# Patient Record
Sex: MALE | Race: WHITE | NOT HISPANIC OR LATINO | URBAN - METROPOLITAN AREA
[De-identification: names, ages, dates, MRNs, and addresses within clinical notes are randomized per-mention and may not be internally consistent; named-entity substitution may affect disease eponyms.]

---

## 2024-09-08 RX ORDER — AMOXICILLIN AND CLAVULANATE POTASSIUM 250; 125 MG/1; MG/1
1 TABLET, FILM COATED ORAL
Refills: 0 | DISCHARGE
Start: 2024-09-08 | End: 2024-09-15

## 2024-09-11 ENCOUNTER — INPATIENT (INPATIENT)
Facility: HOSPITAL | Age: 74
LOS: 0 days | Discharge: HOME | DRG: 602 | End: 2024-09-12
Attending: STUDENT IN AN ORGANIZED HEALTH CARE EDUCATION/TRAINING PROGRAM | Admitting: STUDENT IN AN ORGANIZED HEALTH CARE EDUCATION/TRAINING PROGRAM
Payer: COMMERCIAL

## 2024-09-11 VITALS
HEART RATE: 121 BPM | WEIGHT: 145.06 LBS | SYSTOLIC BLOOD PRESSURE: 130 MMHG | DIASTOLIC BLOOD PRESSURE: 91 MMHG | TEMPERATURE: 98 F | OXYGEN SATURATION: 100 % | RESPIRATION RATE: 16 BRPM

## 2024-09-11 DIAGNOSIS — Z29.9 ENCOUNTER FOR PROPHYLACTIC MEASURES, UNSPECIFIED: ICD-10-CM

## 2024-09-11 DIAGNOSIS — I48.91 UNSPECIFIED ATRIAL FIBRILLATION: ICD-10-CM

## 2024-09-11 DIAGNOSIS — L03.90 CELLULITIS, UNSPECIFIED: ICD-10-CM

## 2024-09-11 DIAGNOSIS — G20.A1 PARKINSON'S DISEASE WITHOUT DYSKINESIA, WITHOUT MENTION OF FLUCTUATIONS: ICD-10-CM

## 2024-09-11 LAB
A1C WITH ESTIMATED AVERAGE GLUCOSE RESULT: 5.2 % — SIGNIFICANT CHANGE UP (ref 4–5.6)
ADD ON TEST-SPECIMEN IN LAB: SIGNIFICANT CHANGE UP
ALBUMIN SERPL ELPH-MCNC: 2.8 G/DL — LOW (ref 3.3–5)
ALBUMIN SERPL ELPH-MCNC: 3.4 G/DL — SIGNIFICANT CHANGE UP (ref 3.3–5)
ALP SERPL-CCNC: 59 U/L — SIGNIFICANT CHANGE UP (ref 40–120)
ALP SERPL-CCNC: 70 U/L — SIGNIFICANT CHANGE UP (ref 40–120)
ALT FLD-CCNC: 16 U/L — SIGNIFICANT CHANGE UP (ref 12–78)
ALT FLD-CCNC: 17 U/L — SIGNIFICANT CHANGE UP (ref 12–78)
ANION GAP SERPL CALC-SCNC: 5 MMOL/L — SIGNIFICANT CHANGE UP (ref 5–17)
ANION GAP SERPL CALC-SCNC: 6 MMOL/L — SIGNIFICANT CHANGE UP (ref 5–17)
APTT BLD: 29.2 SEC — SIGNIFICANT CHANGE UP (ref 24.5–35.6)
AST SERPL-CCNC: 13 U/L — LOW (ref 15–37)
AST SERPL-CCNC: 22 U/L — SIGNIFICANT CHANGE UP (ref 15–37)
BASOPHILS # BLD AUTO: 0 K/UL — SIGNIFICANT CHANGE UP (ref 0–0.2)
BASOPHILS # BLD AUTO: 0.02 K/UL — SIGNIFICANT CHANGE UP (ref 0–0.2)
BASOPHILS NFR BLD AUTO: 0 % — SIGNIFICANT CHANGE UP (ref 0–2)
BASOPHILS NFR BLD AUTO: 0.4 % — SIGNIFICANT CHANGE UP (ref 0–2)
BILIRUB SERPL-MCNC: 0.8 MG/DL — SIGNIFICANT CHANGE UP (ref 0.2–1.2)
BILIRUB SERPL-MCNC: 1.2 MG/DL — SIGNIFICANT CHANGE UP (ref 0.2–1.2)
BUN SERPL-MCNC: 15 MG/DL — SIGNIFICANT CHANGE UP (ref 7–23)
BUN SERPL-MCNC: 18 MG/DL — SIGNIFICANT CHANGE UP (ref 7–23)
CALCIUM SERPL-MCNC: 8.1 MG/DL — LOW (ref 8.5–10.1)
CALCIUM SERPL-MCNC: 8.9 MG/DL — SIGNIFICANT CHANGE UP (ref 8.5–10.1)
CHLORIDE SERPL-SCNC: 105 MMOL/L — SIGNIFICANT CHANGE UP (ref 96–108)
CHLORIDE SERPL-SCNC: 108 MMOL/L — SIGNIFICANT CHANGE UP (ref 96–108)
CHOLEST SERPL-MCNC: 137 MG/DL — SIGNIFICANT CHANGE UP
CO2 SERPL-SCNC: 23 MMOL/L — SIGNIFICANT CHANGE UP (ref 22–31)
CO2 SERPL-SCNC: 27 MMOL/L — SIGNIFICANT CHANGE UP (ref 22–31)
CREAT SERPL-MCNC: 0.8 MG/DL — SIGNIFICANT CHANGE UP (ref 0.5–1.3)
CREAT SERPL-MCNC: 1.03 MG/DL — SIGNIFICANT CHANGE UP (ref 0.5–1.3)
CRP SERPL-MCNC: 16 MG/L — HIGH
D DIMER BLD IA.RAPID-MCNC: 198 NG/ML DDU — SIGNIFICANT CHANGE UP
EGFR: 76 ML/MIN/1.73M2 — SIGNIFICANT CHANGE UP
EGFR: 93 ML/MIN/1.73M2 — SIGNIFICANT CHANGE UP
EOSINOPHIL # BLD AUTO: 0.07 K/UL — SIGNIFICANT CHANGE UP (ref 0–0.5)
EOSINOPHIL # BLD AUTO: 0.28 K/UL — SIGNIFICANT CHANGE UP (ref 0–0.5)
EOSINOPHIL NFR BLD AUTO: 1.3 % — SIGNIFICANT CHANGE UP (ref 0–6)
EOSINOPHIL NFR BLD AUTO: 5 % — SIGNIFICANT CHANGE UP (ref 0–6)
ERYTHROCYTE [SEDIMENTATION RATE] IN BLOOD: 12 MM/HR — SIGNIFICANT CHANGE UP (ref 0–20)
ESTIMATED AVERAGE GLUCOSE: 103 MG/DL — SIGNIFICANT CHANGE UP (ref 68–114)
FLUAV AG NPH QL: SIGNIFICANT CHANGE UP
FLUBV AG NPH QL: SIGNIFICANT CHANGE UP
GLUCOSE SERPL-MCNC: 130 MG/DL — HIGH (ref 70–99)
GLUCOSE SERPL-MCNC: 143 MG/DL — HIGH (ref 70–99)
HCT VFR BLD CALC: 36 % — LOW (ref 39–50)
HCT VFR BLD CALC: 39.7 % — SIGNIFICANT CHANGE UP (ref 39–50)
HDLC SERPL-MCNC: 43 MG/DL — SIGNIFICANT CHANGE UP
HGB BLD-MCNC: 12.5 G/DL — LOW (ref 13–17)
HGB BLD-MCNC: 13.8 G/DL — SIGNIFICANT CHANGE UP (ref 13–17)
IMM GRANULOCYTES NFR BLD AUTO: 0.4 % — SIGNIFICANT CHANGE UP (ref 0–0.9)
INR BLD: 0.89 RATIO — SIGNIFICANT CHANGE UP (ref 0.85–1.18)
LACTATE SERPL-SCNC: 1.2 MMOL/L — SIGNIFICANT CHANGE UP (ref 0.7–2)
LIPID PNL WITH DIRECT LDL SERPL: 83 MG/DL — SIGNIFICANT CHANGE UP
LYMPHOCYTES # BLD AUTO: 1.03 K/UL — SIGNIFICANT CHANGE UP (ref 1–3.3)
LYMPHOCYTES # BLD AUTO: 1.48 K/UL — SIGNIFICANT CHANGE UP (ref 1–3.3)
LYMPHOCYTES # BLD AUTO: 18.8 % — SIGNIFICANT CHANGE UP (ref 13–44)
LYMPHOCYTES # BLD AUTO: 26 % — SIGNIFICANT CHANGE UP (ref 13–44)
MAGNESIUM SERPL-MCNC: 1.9 MG/DL — SIGNIFICANT CHANGE UP (ref 1.6–2.6)
MAGNESIUM SERPL-MCNC: 2.2 MG/DL — SIGNIFICANT CHANGE UP (ref 1.6–2.6)
MANUAL SMEAR VERIFICATION: SIGNIFICANT CHANGE UP
MCHC RBC-ENTMCNC: 30.1 PG — SIGNIFICANT CHANGE UP (ref 27–34)
MCHC RBC-ENTMCNC: 30.3 PG — SIGNIFICANT CHANGE UP (ref 27–34)
MCHC RBC-ENTMCNC: 34.7 GM/DL — SIGNIFICANT CHANGE UP (ref 32–36)
MCHC RBC-ENTMCNC: 34.8 GM/DL — SIGNIFICANT CHANGE UP (ref 32–36)
MCV RBC AUTO: 86.7 FL — SIGNIFICANT CHANGE UP (ref 80–100)
MCV RBC AUTO: 87.4 FL — SIGNIFICANT CHANGE UP (ref 80–100)
MONOCYTES # BLD AUTO: 0.68 K/UL — SIGNIFICANT CHANGE UP (ref 0–0.9)
MONOCYTES # BLD AUTO: 0.74 K/UL — SIGNIFICANT CHANGE UP (ref 0–0.9)
MONOCYTES NFR BLD AUTO: 12 % — SIGNIFICANT CHANGE UP (ref 2–14)
MONOCYTES NFR BLD AUTO: 13.5 % — SIGNIFICANT CHANGE UP (ref 2–14)
NEUTROPHILS # BLD AUTO: 3.24 K/UL — SIGNIFICANT CHANGE UP (ref 1.8–7.4)
NEUTROPHILS # BLD AUTO: 3.61 K/UL — SIGNIFICANT CHANGE UP (ref 1.8–7.4)
NEUTROPHILS NFR BLD AUTO: 57 % — SIGNIFICANT CHANGE UP (ref 43–77)
NEUTROPHILS NFR BLD AUTO: 65.6 % — SIGNIFICANT CHANGE UP (ref 43–77)
NON HDL CHOLESTEROL: 94 MG/DL — SIGNIFICANT CHANGE UP
NRBC # BLD: 0 /100 WBCS — SIGNIFICANT CHANGE UP (ref 0–0)
NRBC # BLD: SIGNIFICANT CHANGE UP /100 WBCS (ref 0–0)
PHOSPHATE SERPL-MCNC: 3.1 MG/DL — SIGNIFICANT CHANGE UP (ref 2.5–4.5)
PLAT MORPH BLD: NORMAL — SIGNIFICANT CHANGE UP
PLATELET # BLD AUTO: 146 K/UL — LOW (ref 150–400)
PLATELET # BLD AUTO: 152 K/UL — SIGNIFICANT CHANGE UP (ref 150–400)
POTASSIUM SERPL-MCNC: 4.1 MMOL/L — SIGNIFICANT CHANGE UP (ref 3.5–5.3)
POTASSIUM SERPL-MCNC: 4.2 MMOL/L — SIGNIFICANT CHANGE UP (ref 3.5–5.3)
POTASSIUM SERPL-SCNC: 4.1 MMOL/L — SIGNIFICANT CHANGE UP (ref 3.5–5.3)
POTASSIUM SERPL-SCNC: 4.2 MMOL/L — SIGNIFICANT CHANGE UP (ref 3.5–5.3)
PROT SERPL-MCNC: 5.6 GM/DL — LOW (ref 6–8.3)
PROT SERPL-MCNC: 6.4 GM/DL — SIGNIFICANT CHANGE UP (ref 6–8.3)
PROTHROM AB SERPL-ACNC: 10.1 SEC — SIGNIFICANT CHANGE UP (ref 9.5–13)
RBC # BLD: 4.12 M/UL — LOW (ref 4.2–5.8)
RBC # BLD: 4.58 M/UL — SIGNIFICANT CHANGE UP (ref 4.2–5.8)
RBC # FLD: 12.6 % — SIGNIFICANT CHANGE UP (ref 10.3–14.5)
RBC # FLD: 12.6 % — SIGNIFICANT CHANGE UP (ref 10.3–14.5)
RBC BLD AUTO: NORMAL — SIGNIFICANT CHANGE UP
RSV RNA NPH QL NAA+NON-PROBE: SIGNIFICANT CHANGE UP
SARS-COV-2 RNA SPEC QL NAA+PROBE: SIGNIFICANT CHANGE UP
SODIUM SERPL-SCNC: 137 MMOL/L — SIGNIFICANT CHANGE UP (ref 135–145)
SODIUM SERPL-SCNC: 137 MMOL/L — SIGNIFICANT CHANGE UP (ref 135–145)
TRIGL SERPL-MCNC: 50 MG/DL — SIGNIFICANT CHANGE UP
TROPONIN I, HIGH SENSITIVITY RESULT: 4.28 NG/L — SIGNIFICANT CHANGE UP
TROPONIN I, HIGH SENSITIVITY RESULT: 7.77 NG/L — SIGNIFICANT CHANGE UP
TSH SERPL-MCNC: 1.28 UU/ML — SIGNIFICANT CHANGE UP (ref 0.34–4.82)
WBC # BLD: 5.49 K/UL — SIGNIFICANT CHANGE UP (ref 3.8–10.5)
WBC # BLD: 5.69 K/UL — SIGNIFICANT CHANGE UP (ref 3.8–10.5)
WBC # FLD AUTO: 5.49 K/UL — SIGNIFICANT CHANGE UP (ref 3.8–10.5)
WBC # FLD AUTO: 5.69 K/UL — SIGNIFICANT CHANGE UP (ref 3.8–10.5)

## 2024-09-11 PROCEDURE — 71045 X-RAY EXAM CHEST 1 VIEW: CPT | Mod: 26

## 2024-09-11 PROCEDURE — 80053 COMPREHEN METABOLIC PANEL: CPT

## 2024-09-11 PROCEDURE — 85025 COMPLETE CBC W/AUTO DIFF WBC: CPT

## 2024-09-11 PROCEDURE — 84100 ASSAY OF PHOSPHORUS: CPT

## 2024-09-11 PROCEDURE — 99223 1ST HOSP IP/OBS HIGH 75: CPT

## 2024-09-11 PROCEDURE — 99053 MED SERV 10PM-8AM 24 HR FAC: CPT

## 2024-09-11 PROCEDURE — 85652 RBC SED RATE AUTOMATED: CPT

## 2024-09-11 PROCEDURE — 93005 ELECTROCARDIOGRAM TRACING: CPT

## 2024-09-11 PROCEDURE — 80061 LIPID PANEL: CPT

## 2024-09-11 PROCEDURE — 87040 BLOOD CULTURE FOR BACTERIA: CPT

## 2024-09-11 PROCEDURE — 93306 TTE W/DOPPLER COMPLETE: CPT | Mod: 26

## 2024-09-11 PROCEDURE — 93010 ELECTROCARDIOGRAM REPORT: CPT

## 2024-09-11 PROCEDURE — 85027 COMPLETE CBC AUTOMATED: CPT

## 2024-09-11 PROCEDURE — 83605 ASSAY OF LACTIC ACID: CPT

## 2024-09-11 PROCEDURE — 83036 HEMOGLOBIN GLYCOSYLATED A1C: CPT

## 2024-09-11 PROCEDURE — 86140 C-REACTIVE PROTEIN: CPT

## 2024-09-11 PROCEDURE — 85379 FIBRIN DEGRADATION QUANT: CPT

## 2024-09-11 PROCEDURE — 80048 BASIC METABOLIC PNL TOTAL CA: CPT

## 2024-09-11 PROCEDURE — 99285 EMERGENCY DEPT VISIT HI MDM: CPT

## 2024-09-11 PROCEDURE — 84443 ASSAY THYROID STIM HORMONE: CPT

## 2024-09-11 PROCEDURE — 83735 ASSAY OF MAGNESIUM: CPT

## 2024-09-11 PROCEDURE — 93971 EXTREMITY STUDY: CPT | Mod: 26,LT

## 2024-09-11 PROCEDURE — 84484 ASSAY OF TROPONIN QUANT: CPT

## 2024-09-11 PROCEDURE — 36415 COLL VENOUS BLD VENIPUNCTURE: CPT

## 2024-09-11 RX ORDER — ENOXAPARIN SODIUM 100 MG/ML
40 INJECTION SUBCUTANEOUS EVERY 24 HOURS
Refills: 0 | Status: DISCONTINUED | OUTPATIENT
Start: 2024-09-11 | End: 2024-09-12

## 2024-09-11 RX ORDER — SODIUM CHLORIDE 9 MG/ML
1000 INJECTION INTRAMUSCULAR; INTRAVENOUS; SUBCUTANEOUS ONCE
Refills: 0 | Status: COMPLETED | OUTPATIENT
Start: 2024-09-11 | End: 2024-09-11

## 2024-09-11 RX ORDER — METOPROLOL TARTRATE 100 MG/1
12.5 TABLET ORAL THREE TIMES A DAY
Refills: 0 | Status: DISCONTINUED | OUTPATIENT
Start: 2024-09-11 | End: 2024-09-12

## 2024-09-11 RX ORDER — DILTIAZEM HYDROCHLORIDE 5 MG/ML
5 INJECTION INTRAVENOUS
Qty: 125 | Refills: 0 | Status: DISCONTINUED | OUTPATIENT
Start: 2024-09-11 | End: 2024-09-11

## 2024-09-11 RX ORDER — CARBIDOPA/LEVODOPA 25MG-250MG
1 TABLET ORAL
Refills: 0 | DISCHARGE

## 2024-09-11 RX ORDER — DOXYCYCLINE MONOHYDRATE 100 MG
100 TABLET ORAL EVERY 12 HOURS
Refills: 0 | Status: DISCONTINUED | OUTPATIENT
Start: 2024-09-11 | End: 2024-09-12

## 2024-09-11 RX ORDER — MUPIROCIN 2 %
1 OINTMENT (GRAM) TOPICAL ONCE
Refills: 0 | Status: COMPLETED | OUTPATIENT
Start: 2024-09-11 | End: 2024-09-11

## 2024-09-11 RX ORDER — ROTIGOTINE 6 MG/24 HR
1 PATCH, TRANSDERMAL 24 HOURS TRANSDERMAL EVERY 24 HOURS
Refills: 0 | Status: DISCONTINUED | OUTPATIENT
Start: 2024-09-11 | End: 2024-09-11

## 2024-09-11 RX ORDER — DILTIAZEM HYDROCHLORIDE 5 MG/ML
20 INJECTION INTRAVENOUS ONCE
Refills: 0 | Status: COMPLETED | OUTPATIENT
Start: 2024-09-11 | End: 2024-09-11

## 2024-09-11 RX ORDER — ONDANSETRON 2 MG/ML
4 INJECTION, SOLUTION INTRAMUSCULAR; INTRAVENOUS EVERY 6 HOURS
Refills: 0 | Status: DISCONTINUED | OUTPATIENT
Start: 2024-09-11 | End: 2024-09-12

## 2024-09-11 RX ORDER — CARBIDOPA/LEVODOPA 25MG-250MG
1 TABLET ORAL THREE TIMES A DAY
Refills: 0 | Status: DISCONTINUED | OUTPATIENT
Start: 2024-09-11 | End: 2024-09-12

## 2024-09-11 RX ORDER — ASPIRIN 81 MG
81 TABLET, DELAYED RELEASE (ENTERIC COATED) ORAL DAILY
Refills: 0 | Status: DISCONTINUED | OUTPATIENT
Start: 2024-09-11 | End: 2024-09-11

## 2024-09-11 RX ORDER — FLU VACCINE TS 2012-2013(5YR+) 45MCG/.5ML
0.5 VIAL (ML) INTRAMUSCULAR ONCE
Refills: 0 | Status: DISCONTINUED | OUTPATIENT
Start: 2024-09-11 | End: 2024-09-12

## 2024-09-11 RX ORDER — ROTIGOTINE 6 MG/24 HR
2 PATCH, TRANSDERMAL 24 HOURS TRANSDERMAL EVERY 24 HOURS
Refills: 0 | Status: DISCONTINUED | OUTPATIENT
Start: 2024-09-11 | End: 2024-09-12

## 2024-09-11 RX ORDER — DOXYCYCLINE MONOHYDRATE 100 MG
TABLET ORAL
Refills: 0 | Status: DISCONTINUED | OUTPATIENT
Start: 2024-09-11 | End: 2024-09-12

## 2024-09-11 RX ORDER — DILTIAZEM HYDROCHLORIDE 5 MG/ML
30 INJECTION INTRAVENOUS ONCE
Refills: 0 | Status: COMPLETED | OUTPATIENT
Start: 2024-09-11 | End: 2024-09-11

## 2024-09-11 RX ORDER — VANCOMYCIN/0.9 % SOD CHLORIDE 1.75G/25
1000 PLASTIC BAG, INJECTION (ML) INTRAVENOUS ONCE
Refills: 0 | Status: COMPLETED | OUTPATIENT
Start: 2024-09-11 | End: 2024-09-11

## 2024-09-11 RX ORDER — ASPIRIN 81 MG
325 TABLET, DELAYED RELEASE (ENTERIC COATED) ORAL ONCE
Refills: 0 | Status: COMPLETED | OUTPATIENT
Start: 2024-09-11 | End: 2024-09-11

## 2024-09-11 RX ORDER — APIXABAN 5 MG/1
5 TABLET, FILM COATED ORAL EVERY 12 HOURS
Refills: 0 | Status: DISCONTINUED | OUTPATIENT
Start: 2024-09-11 | End: 2024-09-11

## 2024-09-11 RX ORDER — ROTIGOTINE 6 MG/24 HR
1 PATCH, TRANSDERMAL 24 HOURS TRANSDERMAL
Refills: 0 | DISCHARGE

## 2024-09-11 RX ORDER — ASPIRIN 81 MG
81 TABLET, DELAYED RELEASE (ENTERIC COATED) ORAL DAILY
Refills: 0 | Status: DISCONTINUED | OUTPATIENT
Start: 2024-09-11 | End: 2024-09-12

## 2024-09-11 RX ORDER — MAGNESIUM, ALUMINUM HYDROXIDE 200-225/5
30 SUSPENSION, ORAL (FINAL DOSE FORM) ORAL EVERY 4 HOURS
Refills: 0 | Status: DISCONTINUED | OUTPATIENT
Start: 2024-09-11 | End: 2024-09-12

## 2024-09-11 RX ORDER — ACETAMINOPHEN 325 MG/1
650 TABLET ORAL EVERY 6 HOURS
Refills: 0 | Status: DISCONTINUED | OUTPATIENT
Start: 2024-09-11 | End: 2024-09-12

## 2024-09-11 RX ORDER — DOXYCYCLINE MONOHYDRATE 100 MG
100 TABLET ORAL ONCE
Refills: 0 | Status: COMPLETED | OUTPATIENT
Start: 2024-09-11 | End: 2024-09-11

## 2024-09-11 RX ADMIN — SODIUM CHLORIDE 1000 MILLILITER(S): 9 INJECTION INTRAMUSCULAR; INTRAVENOUS; SUBCUTANEOUS at 03:59

## 2024-09-11 RX ADMIN — Medication 1 APPLICATION(S): at 05:31

## 2024-09-11 RX ADMIN — Medication 2 PATCH: at 09:01

## 2024-09-11 RX ADMIN — Medication 100 MILLILITER(S): at 06:06

## 2024-09-11 RX ADMIN — Medication 250 MILLIGRAM(S): at 06:08

## 2024-09-11 RX ADMIN — Medication 1 TABLET(S): at 14:29

## 2024-09-11 RX ADMIN — DILTIAZEM HYDROCHLORIDE 20 MILLIGRAM(S): 5 INJECTION INTRAVENOUS at 03:18

## 2024-09-11 RX ADMIN — Medication 325 MILLIGRAM(S): at 06:07

## 2024-09-11 RX ADMIN — Medication 1000 MILLIGRAM(S): at 05:31

## 2024-09-11 RX ADMIN — SODIUM CHLORIDE 1000 MILLILITER(S): 9 INJECTION INTRAMUSCULAR; INTRAVENOUS; SUBCUTANEOUS at 03:17

## 2024-09-11 RX ADMIN — Medication 1 TABLET(S): at 22:36

## 2024-09-11 RX ADMIN — DILTIAZEM HYDROCHLORIDE 30 MILLIGRAM(S): 5 INJECTION INTRAVENOUS at 04:57

## 2024-09-11 RX ADMIN — METOPROLOL TARTRATE 12.5 MILLIGRAM(S): 100 TABLET ORAL at 10:33

## 2024-09-11 RX ADMIN — Medication 110 MILLIGRAM(S): at 08:06

## 2024-09-11 RX ADMIN — METOPROLOL TARTRATE 12.5 MILLIGRAM(S): 100 TABLET ORAL at 14:30

## 2024-09-11 RX ADMIN — Medication 110 MILLIGRAM(S): at 18:11

## 2024-09-11 RX ADMIN — ENOXAPARIN SODIUM 40 MILLIGRAM(S): 100 INJECTION SUBCUTANEOUS at 18:13

## 2024-09-11 NOTE — H&P ADULT - PROBLEM SELECTOR PLAN 2
On augmentin for 3 days  s/p 1 dose rocephin in ED  started on doxycycline. On augmentin for 3 days  s/p 1 dose rocephin in ED  started on doxycycline.  f/u esr/crp  monitor for improvement.

## 2024-09-11 NOTE — ED PROVIDER NOTE - OBJECTIVE STATEMENT
Pt. is a 74 yo M with hx of parkinsons disease presents with chest discomfort and feeling of heart racing that came on suddenly while at rest prior to arrival. Patient does not have any history of cardiac disease or arrhythmia.  He is visiting from [place] and has been in [place] for 2 wks.  States he has left calf pain for 1 week.  He got bit or stung by in insect, had itchy area on left posterior calf that got infected.  He went to urgent care and has been taking augmentin for a few days.  Thinks redness and pain has only minimally improved.  Denies falling or any injury.  Denies fever.  Denies SOB, dizziness, vomiting.

## 2024-09-11 NOTE — CONSULT NOTE ADULT - PROBLEM SELECTOR RECOMMENDATION 9
Continue with ASA  obtain Echo results  Begin Cardizem drip with hold parameters   Continue with Metoprolol with hold parameters  treat infectious process of lLE At Fib with RVR     Continue with ASA (CHADS VASC is 1 based on age) , pt is also a fall risk with h/o of Parkinson Dz  obtain Echo results  Begin Cardizem drip with hold parameters   Continue with Metoprolol with hold parameters  Treat infectious process of lLE At Fib with RVR     Continue with ASA (CHADS VASC is 1 based on age) , pt is also a fall risk with h/o of Parkinson Dz  obtain Echo results  Continue with Metoprolol with hold parameters  Treat infectious process of lLE

## 2024-09-11 NOTE — ED PROVIDER NOTE - CARE PLAN
1 Principal Discharge DX:	Rapid atrial fibrillation   Principal Discharge DX:	Rapid atrial fibrillation  Secondary Diagnosis:	Cellulitis of left lower leg

## 2024-09-11 NOTE — ED PROVIDER NOTE - MUSCULOSKELETAL, MLM
Spine appears normal, range of motion is not limited, mild left calf tenderness; normal pedal pulses

## 2024-09-11 NOTE — ED ADULT TRIAGE NOTE - CHIEF COMPLAINT QUOTE
patient ambulatory to triage complaining of mid sternal chest pressure x 20 minutes that started while laying in bed. states he feels like heart is beating rapidly and skipping beats. denies cardiac history. no respiratory distress. ekg in progress.  patient also complains of worsening cellulitis on left lower calf that has not improved with oral antibiotics.

## 2024-09-11 NOTE — ED PROVIDER NOTE - ENMT, MLM
Retention Suture Bite Size: 3 mm Airway patent, Nasal mucosa clear. Mouth with normal mucosa. Throat has no vesicles, no oropharyngeal exudates and uvula is midline.

## 2024-09-11 NOTE — ED PROVIDER NOTE - SKIN, MLM
+erythema and warmth of left lower leg posteriorly; +circular 1cm ecchymotic blister on posterior distal left lower leg.

## 2024-09-11 NOTE — ED ADULT NURSE NOTE - NSFALLRISKINTERV_ED_ALL_ED

## 2024-09-11 NOTE — H&P ADULT - NSHPPHYSICALEXAM_GEN_ALL_CORE
T(C): 36.6 (09-11-24 @ 02:20), Max: 36.6 (09-11-24 @ 02:20)  HR: 121 (09-11-24 @ 02:20) (121 - 121)  BP: 130/91 (09-11-24 @ 02:20) (130/91 - 130/91)  RR: 16 (09-11-24 @ 02:20) (16 - 16)  SpO2: 100% (09-11-24 @ 02:20) (100% - 100%)    General: Frail  HEENT: non-traumatic, perrla, eomi  Cardio: s1s2, irregular irregular  Lungs: comfortable breathing, clear to auscultation  Abdomen: Soft, non-tender, non-distended  Neuro: AOx4  Ext: Left lower ext lateral round area of erythema with central dark spot, w/ swelling and tender to touch.

## 2024-09-11 NOTE — CONSULT NOTE ADULT - ASSESSMENT
73 year old male with new onset of AT Fib.  Although his lactate level is normal he is being treated for a bug bit of his lower extremity.  He has a hx of Parkinson's Disease.   IN Ed was given Cardizem 20 mgs IVP and now is on Cardizem drip, started on Metoprolol 12.5 mg tid. His lower leg infection is being treated with borad spectum antibiloic  CHADS VASC is 1 based on age.  He lives in [place] dopplers of both legs negative for DVT  PLAN:  Rate control for new onset of AT Fib     Continue with ASA 81 mgs  Obtain ECHO  slow hydration  73 year old male with new onset of AT Fib.  Although his lactate level is normal he is being treated for a bug bit of his lower extremity.  He has a hx of Parkinson's Disease.   IN Ed was given Cardizem 20 mgs IVP and now is on Cardizem drip, started on Metoprolol 12.5 mg tid. His lower leg infection is being treated with broad spectrum antibiotic  CHADS VASC is 1 based on age.  He lives in [place] dopplers of both legs negative for DVT  PLAN:  Rate control for new onset of AT Fib     Continue with ASA 81 mgs  Obtain ECHO  slow hydration   begin Cardizem drip   admit to telemetry  Continue to treat bu bite with antibiotics  73 year old male with new onset of AT Fib.  Although his lactate level is normal he is being treated for a bug bit of his lower extremity.  He has a hx of Parkinson's Disease.   IN Ed was given Cardizem 20 mgs IVP started on Metoprolol 12.5 mg tid. His lower leg infection is being treated with broad spectrum antibiotic  CHADS VASC is 1 based on age howeer with RHD should be on Eliquis until results of ECHO reviewed..  He lives in [place] dopplers of both legs negative for DVT  PLAN:  Rate control for new onset of AT Fib     DC ASA and Begin Eliquis   Obtain ECHO  Slow hydration   admit to telemetry  Continue to treat bug bite with antibiotics  73 year old male with new onset of AT Fib.  Although his lactate level is normal he is being treated for a bug bit of his lower extremity.  He has a hx of Parkinson's Disease.   IN Ed was given Cardizem 20 mgs IVP started on Metoprolol 12.5 mg tid. His lower leg infection is being treated with broad spectrum antibiotic  CHADS VASC is 1 based on age however with RHD should be on Eliquis until results of ECHO reviewed..  He lives in [place] dopplers of both legs negative for DVT    PLAN:  Rate control for new onset of AT Fib    Continue with ASA  Obtain ECHO-pending echo results may start Eliquis   Slow hydration   admit to telemetry  Continue to treat bug bite with antibiotics  73 year old male with new onset of AT Fib.  Although his lactate level is normal he is being treated for a bug bit of his lower extremity.  He has a hx of Parkinson's Disease.   IN Ed was given Cardizem 20 mgs IVP started on Metoprolol 12.5 mg tid. His lower leg infection is being treated with broad spectrum antibiotic  CHADS VASC is 1 based on age however with RHD.  He lives in [place] dopplers of both legs negative for DVT    PLAN:  Rate control for new onset of AT Fib    Continue with ASA  Obtain ECHO-pending echo results may start Eliquis   Slow hydration   admit to telemetry  Continue to treat bug bite with antibiotics

## 2024-09-11 NOTE — CONSULT NOTE ADULT - SUBJECTIVE AND OBJECTIVE BOX
73M with history of Parkinson p/w complaints of palpitations.   Patient is from [place], in [place] visiting daughter. Had possible "Bug bite" on left lower lateral calf 1 week ago, formed a round area of erythematous skin with central dark spot. He went to  and was prescribed Augmentin, which he has been taking for 3 days. The wife at bedside reports er the rash had a rapid progression initially. Last night patient woke up around 1 am with feeling of palpitations and came to [facility]. Patient reports 1 similar episode of palpitations in the past, several years ago, and was told by his doctor to stop drinking caffeine and never had it again. Reports he has been drinking caffeine again for the past 3 years, and had been ok, drinks 2-3 cups a day. No other acute complaints and     In ED patient found to be in Afib w/ RVR, received diltiazem and IV fluids, BP became soft, HR still in 130s-150s, in Afib, admitted to  for further care.          PAST MEDICAL & SURGICAL HISTORY:  Parkinson's disease          FAMILY HISTORY:      MEDICATIONS  (STANDING):  carbidopa/levodopa  25/100 1 Tablet(s) Oral three times a day  doxycycline IVPB      doxycycline IVPB 100 milliGRAM(s) IV Intermittent every 12 hours  enoxaparin Injectable 40 milliGRAM(s) SubCutaneous every 24 hours  influenza  Vaccine (HIGH DOSE) 0.5 milliLiter(s) IntraMuscular once  lactated ringers. 1000 milliLiter(s) (100 mL/Hr) IV Continuous <Continuous>  metoprolol tartrate 12.5 milliGRAM(s) Oral three times a day  rotigotine patch 4 mG/24 Hr(s) 2 Patch Transdermal every 24 hours    MEDICATIONS  (PRN):  acetaminophen     Tablet .. 650 milliGRAM(s) Oral every 6 hours PRN Mild Pain (1 - 3)  aluminum hydroxide/magnesium hydroxide/simethicone Suspension 30 milliLiter(s) Oral every 4 hours PRN Dyspepsia  melatonin 3 milliGRAM(s) Oral at bedtime PRN Insomnia  ondansetron Injectable 4 milliGRAM(s) IV Push every 6 hours PRN Nausea and/or Vomiting      Allergies-No Known Allergies    Intolerances        SOCIAL HISTORY: Denies tobacco, etoh abuse or illicit drug use        Vital Signs Last 24 Hrs  T(C): 37.1 (11 Sep 2024 06:47), Max: 37.3 (11 Sep 2024 05:20)  T(F): 98.8 (11 Sep 2024 06:47), Max: 99.2 (11 Sep 2024 05:20)  HR: 91 (11 Sep 2024 06:47) (91 - 143)  BP: 98/76 (11 Sep 2024 06:47) (98/76 - 130/91)  BP(mean): 83 (11 Sep 2024 06:47) (83 - 102)  RR: 18 (11 Sep 2024 06:47) (16 - 18)  SpO2: 99% (11 Sep 2024 06:47) (99% - 100%)    Parameters below as of 11 Sep 2024 06:47  Patient On (Oxygen Delivery Method): room air        REVIEW OF SYSTEMS:    CONSTITUTIONAL:  As per HPI.  HEENT:  Eyes:  No diplopia or blurred vision. ENT:  No earache, sore throat or runny nose.  CARDIOVASCULAR:  No pressure, squeezing, strangling, tightness, heaviness or aching about the chest, neck, axilla or epigastrium.  RESPIRATORY:  No cough, shortness of breath, PND or orthopnea.  GASTROINTESTINAL:  No nausea, vomiting or diarrhea.  GENITOURINARY:  No dysuria, frequency or urgency.  MUSCULOSKELETAL:  As per HPI.  SKIN:  No change in skin, hair or nails.  NEUROLOGIC:  No paresthesias, fasciculations, seizures or weakness.  PSYCHIATRIC:  No disorder of thought or mood.  ENDOCRINE:  No heat or cold intolerance, polyuria or polydipsia.  HEMATOLOGICAL:  No easy bruising or bleedings:  .     PHYSICAL EXAMINATION:    GENERAL APPEARANCE:  Pt. is not currently dyspneic, in no distress. Pt. is alert, oriented, and pleasant.  HEENT:  Pupils are normal and react normally. No icterus. Mucous membranes well colored.  NECK:  Supple. No lymphadenopathy. Jugular venous pressure not elevated. Carotids equal.   HEART:   The cardiac impulse has a normal quality. There are no murmurs, rubs or gallops noted  CHEST:  Chest is clear to auscultation. Normal respiratory effort.  ABDOMEN:  Soft and nontender.   EXTREMITIES:  There is no edema.   SKIN:  No rash or significant lesions are noted.    I&O's Summary      LABS:                        12.5   5.49  )-----------( 146      ( 11 Sep 2024 06:45 )             36.0     09-11    137  |  108  |  15  ----------------------------<  143<H>  4.1   |  23  |  0.80    Ca    8.1<L>      11 Sep 2024 06:45  Phos  3.1     09-11  Mg     1.9     09-11    TPro  5.6<L>  /  Alb  2.8<L>  /  TBili  0.8  /  DBili  x   /  AST  13<L>  /  ALT  16  /  AlkPhos  59  09-11    LIVER FUNCTIONS - ( 11 Sep 2024 06:45 )  Alb: 2.8 g/dL / Pro: 5.6 gm/dL / ALK PHOS: 59 U/L / ALT: 16 U/L / AST: 13 U/L / GGT: x           PT/INR - ( 11 Sep 2024 02:41 )   PT: 10.1 sec;   INR: 0.89 ratio         PTT - ( 11 Sep 2024 02:41 )  PTT:29.2 sec      Urinalysis Basic - ( 11 Sep 2024 06:45 )    Color: x / Appearance: x / SG: x / pH: x  Gluc: 143 mg/dL / Ketone: x  / Bili: x / Urobili: x   Blood: x / Protein: x / Nitrite: x   Leuk Esterase: x / RBC: x / WBC x   Sq Epi: x / Non Sq Epi: x / Bacteria: x          EKG: At [facility] VR of 155 BPM    TELEMETRY: At [facility] with VR of 110-120 BPM    CARDIAC TESTS:    RADIOLOGY & ADDITIONAL STUDIES:           73M with history of Parkinson p/w complaints of palpitations.   Patient is from [place], in [place] visiting daughter. Had possible "Bug bite" on left lower lateral calf 1 week ago, formed a round area of erythematous skin with central dark spot. He went to  and was prescribed Augmentin, which he has been taking for 3 days. The wife at bedside reports er the rash had a rapid progression initially. Last night patient woke up around 1 am with feeling of palpitations and came to [facility]. Patient reports 1 similar episode of palpitations in the past, several years ago, and was told by his doctor to stop drinking caffeine and never had it again. Reports he has been drinking caffeine again for the past 3 years, and had been ok, drinks 2-3 cups a day. No other acute complaints and     In ED patient found to be in Afib w/ RVR, received diltiazem and IV fluids, BP became soft, HR still in 130s-150s, in Afib, admitted to  for further care.          PAST MEDICAL & SURGICAL HISTORY:  Parkinson's disease          FAMILY HISTORY: non contributory for cardiac illnesses      MEDICATIONS  (STANDING):  carbidopa/levodopa  25/100 1 Tablet(s) Oral three times a day  doxycycline IVPB      doxycycline IVPB 100 milliGRAM(s) IV Intermittent every 12 hours  enoxaparin Injectable 40 milliGRAM(s) SubCutaneous every 24 hours  influenza  Vaccine (HIGH DOSE) 0.5 milliLiter(s) IntraMuscular once  lactated ringers. 1000 milliLiter(s) (100 mL/Hr) IV Continuous <Continuous>  metoprolol tartrate 12.5 milliGRAM(s) Oral three times a day  rotigotine patch 4 mG/24 Hr(s) 2 Patch Transdermal every 24 hours    MEDICATIONS  (PRN):  acetaminophen     Tablet .. 650 milliGRAM(s) Oral every 6 hours PRN Mild Pain (1 - 3)  aluminum hydroxide/magnesium hydroxide/simethicone Suspension 30 milliLiter(s) Oral every 4 hours PRN Dyspepsia  melatonin 3 milliGRAM(s) Oral at bedtime PRN Insomnia  ondansetron Injectable 4 milliGRAM(s) IV Push every 6 hours PRN Nausea and/or Vomiting      Allergies-No Known Allergies    Intolerances        SOCIAL HISTORY: Denies tobacco, etoh abuse or illicit drug use. he admits to drinking 2-3 cups of coffee daily.  Lives in [place]        Vital Signs Last 24 Hrs  T(C): 37.1 (11 Sep 2024 06:47), Max: 37.3 (11 Sep 2024 05:20)  T(F): 98.8 (11 Sep 2024 06:47), Max: 99.2 (11 Sep 2024 05:20)  HR: 91 (11 Sep 2024 06:47) (91 - 143)  BP: 98/76 (11 Sep 2024 06:47) (98/76 - 130/91)  BP(mean): 83 (11 Sep 2024 06:47) (83 - 102)  RR: 18 (11 Sep 2024 06:47) (16 - 18)  SpO2: 99% (11 Sep 2024 06:47) (99% - 100%)    Parameters below as of 11 Sep 2024 06:47  Patient On (Oxygen Delivery Method): room air        REVIEW OF SYSTEMS:    CONSTITUTIONAL:  As per HPI.  HEENT:  Eyes:  No diplopia or blurred vision. ENT:  No earache, sore throat or runny nose.  CARDIOVASCULAR:  No pressure, squeezing, strangling, tightness, heaviness or aching about the chest, neck, axilla or epigastrium+ Palpitations   RESPIRATORY:  No cough, shortness of breath, PND or orthopnea.  GASTROINTESTINAL:  No nausea, vomiting or diarrhea.  GENITOURINARY:  No dysuria, frequency or urgency.  MUSCULOSKELETAL:  As per HPI.  SKIN:  No change in skin, hair or nails.  NEUROLOGIC:  No paresthesias, fasciculations, seizures or weakness.  PSYCHIATRIC:  No disorder of thought or mood.  ENDOCRINE:  No heat or cold intolerance, polyuria or polydipsia.  HEMATOLOGICAL:  No easy bruising or bleedings:  .     PHYSICAL EXAMINATION:    GENERAL APPEARANCE:  Pt. is not currently dyspneic, in no distress. Pt. is alert, oriented, and pleasant.  HEENT:  Pupils are normal and react normally. No icterus. Mucous membranes well colored.  NECK:  Supple. No lymphadenopathy. Jugular venous pressure not elevated. Carotids equal.   HEART:   The cardiac impulse has a normal quality. There are no murmurs, rubs or gallops noted  CHEST:  Chest is clear to auscultation. Normal respiratory effort.  ABDOMEN:  Soft and nontender.   EXTREMITIES:  There is no edema. Pt has a dressing over lower left leg, wound is slightly erythredematous  he has a a picture from yesterday that show extreme ecchymosis   SKIN:  No rash or significant lesions are noted.    I&O's Summary      LABS:                        12.5   5.49  )-----------( 146      ( 11 Sep 2024 06:45 )             36.0     09-11    137  |  108  |  15  ----------------------------<  143<H>  4.1   |  23  |  0.80    Ca    8.1<L>      11 Sep 2024 06:45  Phos  3.1     09-11  Mg     1.9     09-11    TPro  5.6<L>  /  Alb  2.8<L>  /  TBili  0.8  /  DBili  x   /  AST  13<L>  /  ALT  16  /  AlkPhos  59  09-11    LIVER FUNCTIONS - ( 11 Sep 2024 06:45 )  Alb: 2.8 g/dL / Pro: 5.6 gm/dL / ALK PHOS: 59 U/L / ALT: 16 U/L / AST: 13 U/L / GGT: x           PT/INR - ( 11 Sep 2024 02:41 )   PT: 10.1 sec;   INR: 0.89 ratio         PTT - ( 11 Sep 2024 02:41 )  PTT:29.2 sec      Urinalysis Basic - ( 11 Sep 2024 06:45 )    Color: x / Appearance: x / SG: x / pH: x  Gluc: 143 mg/dL / Ketone: x  / Bili: x / Urobili: x   Blood: x / Protein: x / Nitrite: x   Leuk Esterase: x / RBC: x / WBC x   Sq Epi: x / Non Sq Epi: x / Bacteria: x          EKG: At [facility] VR of 155 BPM    TELEMETRY: At [facility] with VR of 110-120 BPM    CARDIAC TESTS:    RADIOLOGY & ADDITIONAL STUDIES:           73M with history of Parkinson and Rheumatic fever as a child  p/w complaints of palpitations.   Patient is from [place], in [place] visiting daughter. Had possible "Bug bite" on left lower lateral calf 1 week ago, formed a round area of erythematous skin with central dark spot. He went to  and was prescribed Augmentin, which he has been taking for 3 days. The wife at bedside reports er the rash had a rapid progression initially. Last night patient woke up around 1 am with feeling of palpitations and came to [facility]. Patient reports 1 similar episode of palpitations in the past, several years ago, and was told by his doctor to stop drinking caffeine and never had it again. Reports he has been drinking caffeine again for the past 3 years, and had been ok, drinks 2-3 cups a day. No other acute complaints and     In ED patient found to be in Afib w/ RVR, received diltiazem and IV fluids, BP became soft, HR still in 130s-150s, in Afib, admitted to  for further care.          PAST MEDICAL & SURGICAL HISTORY:  Parkinson's disease          FAMILY HISTORY: non contributory for cardiac illnesses      MEDICATIONS  (STANDING):  carbidopa/levodopa  25/100 1 Tablet(s) Oral three times a day  doxycycline IVPB      doxycycline IVPB 100 milliGRAM(s) IV Intermittent every 12 hours  enoxaparin Injectable 40 milliGRAM(s) SubCutaneous every 24 hours  influenza  Vaccine (HIGH DOSE) 0.5 milliLiter(s) IntraMuscular once  lactated ringers. 1000 milliLiter(s) (100 mL/Hr) IV Continuous <Continuous>  metoprolol tartrate 12.5 milliGRAM(s) Oral three times a day  rotigotine patch 4 mG/24 Hr(s) 2 Patch Transdermal every 24 hours    MEDICATIONS  (PRN):  acetaminophen     Tablet .. 650 milliGRAM(s) Oral every 6 hours PRN Mild Pain (1 - 3)  aluminum hydroxide/magnesium hydroxide/simethicone Suspension 30 milliLiter(s) Oral every 4 hours PRN Dyspepsia  melatonin 3 milliGRAM(s) Oral at bedtime PRN Insomnia  ondansetron Injectable 4 milliGRAM(s) IV Push every 6 hours PRN Nausea and/or Vomiting      Allergies-No Known Allergies    Intolerances        SOCIAL HISTORY: Denies tobacco, etoh abuse or illicit drug use. he admits to drinking 2-3 cups of coffee daily.  Lives in [place]        Vital Signs Last 24 Hrs  T(C): 37.1 (11 Sep 2024 06:47), Max: 37.3 (11 Sep 2024 05:20)  T(F): 98.8 (11 Sep 2024 06:47), Max: 99.2 (11 Sep 2024 05:20)  HR: 91 (11 Sep 2024 06:47) (91 - 143)  BP: 98/76 (11 Sep 2024 06:47) (98/76 - 130/91)  BP(mean): 83 (11 Sep 2024 06:47) (83 - 102)  RR: 18 (11 Sep 2024 06:47) (16 - 18)  SpO2: 99% (11 Sep 2024 06:47) (99% - 100%)    Parameters below as of 11 Sep 2024 06:47  Patient On (Oxygen Delivery Method): room air        REVIEW OF SYSTEMS:    CONSTITUTIONAL:  As per HPI.  HEENT:  Eyes:  No diplopia or blurred vision. ENT:  No earache, sore throat or runny nose.  CARDIOVASCULAR:  No pressure, squeezing, strangling, tightness, heaviness or aching about the chest, neck, axilla or epigastrium+ Palpitations   RESPIRATORY:  No cough, shortness of breath, PND or orthopnea.  GASTROINTESTINAL:  No nausea, vomiting or diarrhea.  GENITOURINARY:  No dysuria, frequency or urgency.  MUSCULOSKELETAL:  As per HPI.  SKIN:  No change in skin, hair or nails.  NEUROLOGIC:  No paresthesias, fasciculations, seizures or weakness.  PSYCHIATRIC:  No disorder of thought or mood.  ENDOCRINE:  No heat or cold intolerance, polyuria or polydipsia.  HEMATOLOGICAL:  No easy bruising or bleedings:  .     PHYSICAL EXAMINATION:    GENERAL APPEARANCE:  Pt. is not currently dyspneic, in no distress. Pt. is alert, oriented, and pleasant.  HEENT:  Pupils are normal and react normally. No icterus. Mucous membranes well colored.  NECK:  Supple. No lymphadenopathy. Jugular venous pressure not elevated. Carotids equal.   HEART:   The cardiac impulse has a normal quality. There are no murmurs, rubs or gallops noted  CHEST:  Chest is clear to auscultation. Normal respiratory effort.  ABDOMEN:  Soft and nontender.   EXTREMITIES:  There is no edema. Pt has a dressing over lower left leg, wound is slightly erythredematous  he has a a picture from yesterday that show extreme ecchymosis   SKIN:  No rash or significant lesions are noted.    I&O's Summary      LABS:                        12.5   5.49  )-----------( 146      ( 11 Sep 2024 06:45 )             36.0     09-11    137  |  108  |  15  ----------------------------<  143<H>  4.1   |  23  |  0.80    Ca    8.1<L>      11 Sep 2024 06:45  Phos  3.1     09-11  Mg     1.9     09-11    TPro  5.6<L>  /  Alb  2.8<L>  /  TBili  0.8  /  DBili  x   /  AST  13<L>  /  ALT  16  /  AlkPhos  59  09-11    LIVER FUNCTIONS - ( 11 Sep 2024 06:45 )  Alb: 2.8 g/dL / Pro: 5.6 gm/dL / ALK PHOS: 59 U/L / ALT: 16 U/L / AST: 13 U/L / GGT: x           PT/INR - ( 11 Sep 2024 02:41 )   PT: 10.1 sec;   INR: 0.89 ratio         PTT - ( 11 Sep 2024 02:41 )  PTT:29.2 sec      Urinalysis Basic - ( 11 Sep 2024 06:45 )    Color: x / Appearance: x / SG: x / pH: x  Gluc: 143 mg/dL / Ketone: x  / Bili: x / Urobili: x   Blood: x / Protein: x / Nitrite: x   Leuk Esterase: x / RBC: x / WBC x   Sq Epi: x / Non Sq Epi: x / Bacteria: x          EKG: At [facility] VR of 155 BPM    TELEMETRY: At [facility] with VR of 110-120 BPM    CARDIAC TESTS:    RADIOLOGY & ADDITIONAL STUDIES:           73M with history of Parkinson and Rheumatic fever as a child  p/w complaints of palpitations.   Patient is from [place], in [place] visiting daughter. Had possible "Bug bite" on left lower lateral calf 1 week ago, formed a round area of erythematous skin with central dark spot. He went to  and was prescribed Augmentin, which he has been taking for 3 days. The wife at bedside reports er the rash had a rapid progression initially. Last night patient woke up around 1 am with feeling of palpitations and came to [facility]. Patient reports 1 similar episode of palpitations in the past, several years ago, and was told by his doctor to stop drinking caffeine and never had it again. Reports he has been drinking caffeine again for the past 3 years, and had been ok, drinks 2-3 cups a day. No other acute complaints and     In ED patient found to be in Afib w/ RVR, received diltiazem and IV fluids, BP became soft, HR still in 130s-150s, in Afib, admitted to  for further care.          PAST MEDICAL & SURGICAL HISTORY:  Parkinson's disease  Rheumatic heart Disease as a child           FAMILY HISTORY: non contributory for cardiac illnesses      MEDICATIONS  (STANDING):  carbidopa/levodopa  25/100 1 Tablet(s) Oral three times a day  doxycycline IVPB      doxycycline IVPB 100 milliGRAM(s) IV Intermittent every 12 hours  enoxaparin Injectable 40 milliGRAM(s) SubCutaneous every 24 hours  influenza  Vaccine (HIGH DOSE) 0.5 milliLiter(s) IntraMuscular once  lactated ringers. 1000 milliLiter(s) (100 mL/Hr) IV Continuous <Continuous>  metoprolol tartrate 12.5 milliGRAM(s) Oral three times a day  rotigotine patch 4 mG/24 Hr(s) 2 Patch Transdermal every 24 hours    MEDICATIONS  (PRN):  acetaminophen     Tablet .. 650 milliGRAM(s) Oral every 6 hours PRN Mild Pain (1 - 3)  aluminum hydroxide/magnesium hydroxide/simethicone Suspension 30 milliLiter(s) Oral every 4 hours PRN Dyspepsia  melatonin 3 milliGRAM(s) Oral at bedtime PRN Insomnia  ondansetron Injectable 4 milliGRAM(s) IV Push every 6 hours PRN Nausea and/or Vomiting      Allergies-No Known Allergies    Intolerances        SOCIAL HISTORY: Denies tobacco, etoh abuse or illicit drug use. he admits to drinking 2-3 cups of coffee daily.  Lives in [place]        Vital Signs Last 24 Hrs  T(C): 37.1 (11 Sep 2024 06:47), Max: 37.3 (11 Sep 2024 05:20)  T(F): 98.8 (11 Sep 2024 06:47), Max: 99.2 (11 Sep 2024 05:20)  HR: 91 (11 Sep 2024 06:47) (91 - 143)  BP: 98/76 (11 Sep 2024 06:47) (98/76 - 130/91)  BP(mean): 83 (11 Sep 2024 06:47) (83 - 102)  RR: 18 (11 Sep 2024 06:47) (16 - 18)  SpO2: 99% (11 Sep 2024 06:47) (99% - 100%)    Parameters below as of 11 Sep 2024 06:47  Patient On (Oxygen Delivery Method): room air        REVIEW OF SYSTEMS:    CONSTITUTIONAL:  As per HPI.  HEENT:  Eyes:  No diplopia or blurred vision. ENT:  No earache, sore throat or runny nose.  CARDIOVASCULAR:  No pressure, squeezing, strangling, tightness, heaviness or aching about the chest, neck, axilla or epigastrium+ Palpitations   RESPIRATORY:  No cough, shortness of breath, PND or orthopnea.  GASTROINTESTINAL:  No nausea, vomiting or diarrhea.  GENITOURINARY:  No dysuria, frequency or urgency.  MUSCULOSKELETAL:  As per HPI.  SKIN:  No change in skin, hair or nails.  NEUROLOGIC:  No paresthesias, fasciculations, seizures or weakness.  PSYCHIATRIC:  No disorder of thought or mood.  ENDOCRINE:  No heat or cold intolerance, polyuria or polydipsia.  HEMATOLOGICAL:  No easy bruising or bleedings:  .     PHYSICAL EXAMINATION:    GENERAL APPEARANCE:  Pt. is not currently dyspneic, in no distress. Pt. is alert, oriented, and pleasant.  HEENT:  Pupils are normal and react normally. No icterus. Mucous membranes well colored.  NECK:  Supple. No lymphadenopathy. Jugular venous pressure not elevated. Carotids equal.   HEART:   The cardiac impulse has a normal quality. There are no murmurs, rubs or gallops noted  CHEST:  Chest is clear to auscultation. Normal respiratory effort.  ABDOMEN:  Soft and nontender.   EXTREMITIES:  There is no edema. Pt has a dressing over lower left leg, wound is slightly erythredematous  he has a a picture from yesterday that show extreme ecchymosis   SKIN:  No rash or significant lesions are noted.    I&O's Summary      LABS:                        12.5   5.49  )-----------( 146      ( 11 Sep 2024 06:45 )             36.0     09-11    137  |  108  |  15  ----------------------------<  143<H>  4.1   |  23  |  0.80    Ca    8.1<L>      11 Sep 2024 06:45  Phos  3.1     09-11  Mg     1.9     09-11    TPro  5.6<L>  /  Alb  2.8<L>  /  TBili  0.8  /  DBili  x   /  AST  13<L>  /  ALT  16  /  AlkPhos  59  09-11    LIVER FUNCTIONS - ( 11 Sep 2024 06:45 )  Alb: 2.8 g/dL / Pro: 5.6 gm/dL / ALK PHOS: 59 U/L / ALT: 16 U/L / AST: 13 U/L / GGT: x           PT/INR - ( 11 Sep 2024 02:41 )   PT: 10.1 sec;   INR: 0.89 ratio         PTT - ( 11 Sep 2024 02:41 )  PTT:29.2 sec      Urinalysis Basic - ( 11 Sep 2024 06:45 )    Color: x / Appearance: x / SG: x / pH: x  Gluc: 143 mg/dL / Ketone: x  / Bili: x / Urobili: x   Blood: x / Protein: x / Nitrite: x   Leuk Esterase: x / RBC: x / WBC x   Sq Epi: x / Non Sq Epi: x / Bacteria: x          EKG: At [facility] VR of 155 BPM    TELEMETRY: At [facility] with VR of 110-120 BPM    CARDIAC TESTS:    RADIOLOGY & ADDITIONAL STUDIES:

## 2024-09-11 NOTE — H&P ADULT - HISTORY OF PRESENT ILLNESS
73M with history of Parkinson p/w complaints of palpitations. Patient    73M with history of Parkinson p/w complaints of palpitations. Patient is from [place], in [place] visiting daughter. Had possible "Bug bite" on left lower lateral calf 1 week ago, formed a round area of erythematous skin with central dark spot. He went to  and was prescribed Augmentin, which he has been taking for 3 days. The wife at bedside reports the rash initially looked more extensive with streaking up the leg, now looks a little better, however the rash had a rapid progression initially. Last night patient woke up around 1 am with feeling of palpitations and came to [facility]. Patient reports 1 similar episode of palpitations in the past, several years ago, and was told by his doctor to stop drinking caffeine and never had it again. Reports he has been drinking caffiene again for the past 3 years, and had been ok, drinks 2-3 cups a day. No other acute complaints and ROS otherwise benign. In ED patient found to be in Afib w/ RVR, recieved diltiazem and IV fluids, BP became soft, HR still in 130s-150s, in Afib, admitted to  for further care.

## 2024-09-11 NOTE — ED ADULT NURSE REASSESSMENT NOTE - NS ED NURSE REASSESS COMMENT FT1
Safety and comfort measures maintained. Pt eating breakfast. No further request from pt at this time.

## 2024-09-11 NOTE — ED PROVIDER NOTE - CLINICAL SUMMARY MEDICAL DECISION MAKING FREE TEXT BOX
72 yo M with new onset rapid afib.  Possibly from skin infection in left lower leg.  Will also get US to r/o DVT in left LE due to recent travel from [place]. Cardizem and IV fluids given.      JAM; Cardizem controlled HR from 130s to 90s.  Patient still in afib. 72 yo M with new onset rapid afib.  Possibly from skin infection in left lower leg.  Will also get US to r/o DVT in left LE due to recent travel from [place]. Cardizem and IV fluids given.      JAM; Cardizem controlled HR from 130s to 90s.  Patient still in afib.    JG: Chads-vasc2 score is 1 for age only.  Will give aspirin only at this time.  This was discussed with the hospitalist.

## 2024-09-11 NOTE — H&P ADULT - NSHPPOAPULMEMBOLUS_GEN_A_CORE
no
Quality 226: Preventive Care And Screening: Tobacco Use: Screening And Cessation Intervention: Patient screened for tobacco use, is a smoker AND received Cessation Counseling within measurement period or in the six months prior to the measurement period
Detail Level: Detailed
Quality 130: Documentation Of Current Medications In The Medical Record: Current Medications Documented
Quality 47: Advance Care Plan: Advance Care Planning discussed and documented; advance care plan or surrogate decision maker documented in the medical record.
Quality 431: Preventive Care And Screening: Unhealthy Alcohol Use - Screening: Patient not identified as an unhealthy alcohol user when screened for unhealthy alcohol use using a systematic screening method

## 2024-09-11 NOTE — ED ADULT NURSE REASSESSMENT NOTE - NS ED NURSE REASSESS COMMENT FT1
Patient resting in ED stretcher. Pt awaiting bed assignment and further orders. Patient profile completed. Care continues as ordered. Denies pain, sob, N/V/D. VSS. Pt A+Ox4, NAD. Call bell within reach. Fall and safety precautions maintained. IVF infusing as per MAR.

## 2024-09-11 NOTE — H&P ADULT - NSHPLABSRESULTS_GEN_ALL_CORE
LABS:  cret                        13.8   5.69  )-----------( 152      ( 11 Sep 2024 02:41 )             39.7     09-11    137  |  105  |  18  ----------------------------<  130<H>  4.2   |  27  |  1.03    Ca    8.9      11 Sep 2024 02:41  Mg     2.2     09-11    TPro  6.4  /  Alb  3.4  /  TBili  1.2  /  DBili  x   /  AST  22  /  ALT  17  /  AlkPhos  70  09-11    PT/INR - ( 11 Sep 2024 02:41 )   PT: 10.1 sec;   INR: 0.89 ratio         PTT - ( 11 Sep 2024 02:41 )  PTT:29.2 sec      < from: US Duplex Venous Lower Ext Ltd, Left (09.11.24 @ 03:53) >    IMPRESSION:  No evidence of left lower extremity deep venous thrombosis.    --- End of Report ---    < end of copied text >

## 2024-09-11 NOTE — H&P ADULT - PROBLEM SELECTOR PLAN 1
New onset  Patient had a recent long flight, limited ambulation - US dopplers neg - send D-dimer. New onset  Patient had a recent long flight, limited ambulation - US dopplers neg - send D-dimer.  Obtain TSH  Start metoprolol tartrate 12.5 TID, c/w IV hydation  Monitor on tele  Cardio and EP consult. New onset  Patient had a recent long flight, limited ambulation - US dopplers neg - send D-dimer.  Obtain TSH  Start metoprolol tartrate 12.5 TID, and aspirin   C/w IV hydration  Monitor on tele  Cardio and EP consult.

## 2024-09-12 VITALS — WEIGHT: 145.51 LBS

## 2024-09-12 LAB
ANION GAP SERPL CALC-SCNC: 6 MMOL/L — SIGNIFICANT CHANGE UP (ref 5–17)
BUN SERPL-MCNC: 11 MG/DL — SIGNIFICANT CHANGE UP (ref 7–23)
CALCIUM SERPL-MCNC: 8.9 MG/DL — SIGNIFICANT CHANGE UP (ref 8.5–10.1)
CHLORIDE SERPL-SCNC: 107 MMOL/L — SIGNIFICANT CHANGE UP (ref 96–108)
CO2 SERPL-SCNC: 26 MMOL/L — SIGNIFICANT CHANGE UP (ref 22–31)
CREAT SERPL-MCNC: 0.85 MG/DL — SIGNIFICANT CHANGE UP (ref 0.5–1.3)
EGFR: 91 ML/MIN/1.73M2 — SIGNIFICANT CHANGE UP
GLUCOSE SERPL-MCNC: 95 MG/DL — SIGNIFICANT CHANGE UP (ref 70–99)
HCT VFR BLD CALC: 36.9 % — LOW (ref 39–50)
HGB BLD-MCNC: 12.8 G/DL — LOW (ref 13–17)
MCHC RBC-ENTMCNC: 30.3 PG — SIGNIFICANT CHANGE UP (ref 27–34)
MCHC RBC-ENTMCNC: 34.7 GM/DL — SIGNIFICANT CHANGE UP (ref 32–36)
MCV RBC AUTO: 87.4 FL — SIGNIFICANT CHANGE UP (ref 80–100)
PLATELET # BLD AUTO: 157 K/UL — SIGNIFICANT CHANGE UP (ref 150–400)
POTASSIUM SERPL-MCNC: 3.8 MMOL/L — SIGNIFICANT CHANGE UP (ref 3.5–5.3)
POTASSIUM SERPL-SCNC: 3.8 MMOL/L — SIGNIFICANT CHANGE UP (ref 3.5–5.3)
RBC # BLD: 4.22 M/UL — SIGNIFICANT CHANGE UP (ref 4.2–5.8)
RBC # FLD: 12.9 % — SIGNIFICANT CHANGE UP (ref 10.3–14.5)
SODIUM SERPL-SCNC: 139 MMOL/L — SIGNIFICANT CHANGE UP (ref 135–145)
WBC # BLD: 5.04 K/UL — SIGNIFICANT CHANGE UP (ref 3.8–10.5)
WBC # FLD AUTO: 5.04 K/UL — SIGNIFICANT CHANGE UP (ref 3.8–10.5)

## 2024-09-12 PROCEDURE — 99239 HOSP IP/OBS DSCHRG MGMT >30: CPT

## 2024-09-12 RX ORDER — METOPROLOL TARTRATE 100 MG/1
12.5 TABLET ORAL
Refills: 0 | Status: DISCONTINUED | OUTPATIENT
Start: 2024-09-12 | End: 2024-09-12

## 2024-09-12 RX ORDER — METOPROLOL TARTRATE 100 MG/1
0.5 TABLET ORAL
Qty: 30 | Refills: 0
Start: 2024-09-12 | End: 2024-10-11

## 2024-09-12 RX ORDER — DOXYCYCLINE MONOHYDRATE 100 MG
1 TABLET ORAL
Qty: 20 | Refills: 0
Start: 2024-09-12 | End: 2024-09-21

## 2024-09-12 RX ORDER — ASPIRIN 81 MG
1 TABLET, DELAYED RELEASE (ENTERIC COATED) ORAL
Qty: 0 | Refills: 0 | DISCHARGE
Start: 2024-09-12

## 2024-09-12 RX ADMIN — Medication 81 MILLIGRAM(S): at 10:26

## 2024-09-12 RX ADMIN — Medication 2 PATCH: at 10:25

## 2024-09-12 RX ADMIN — Medication 1 TABLET(S): at 15:27

## 2024-09-12 RX ADMIN — Medication 1 TABLET(S): at 06:45

## 2024-09-12 RX ADMIN — METOPROLOL TARTRATE 12.5 MILLIGRAM(S): 100 TABLET ORAL at 07:00

## 2024-09-12 RX ADMIN — Medication 110 MILLIGRAM(S): at 06:45

## 2024-09-12 NOTE — DIETITIAN INITIAL EVALUATION ADULT - ADD RECOMMEND
Maintain regular diet  Pt declines supplement  MVI w/ minerals daily to ensure 100% RDA met   Record PO intake in EMR after each meal (flowsheet, nursing.)   Consider adding thiamine 100 mg daily 2/2 poor PO intake/ malnutrition  Monitor bowel movements, if no BM for >3 days, consider implementing bowel regimen.  suggest Confirm Goals of Care regarding nutrition support. Will provide nutrition/ hydration within goals of care.   Monitor PO intake, tolerance, labs and weight.

## 2024-09-12 NOTE — PROGRESS NOTE ADULT - NUTRITIONAL ASSESSMENT
This patient has been assessed with a concern for Malnutrition and has been determined to have a diagnosis/diagnoses of Severe protein-calorie malnutrition.    This patient is being managed with:   Diet Regular-  Entered: Sep 11 2024  5:01AM

## 2024-09-12 NOTE — DISCHARGE NOTE PROVIDER - CARE PROVIDER_API CALL
Usama Davis.  Cardiac Electrophysiology  [address] [place] [address]  Phone: [phone]  Fax: [phone]  Follow Up Time:

## 2024-09-12 NOTE — DIETITIAN INITIAL EVALUATION ADULT - NAME AND PHONE
Anayeli Davis RDN, CDN, [facility]      [phone]   [email or web address]

## 2024-09-12 NOTE — PROGRESS NOTE ADULT - ASSESSMENT
73 year old male with new onset of AT Fib.  Although his lactate level is normal he is being treated for a bug bit of his lower extremity.  He has a hx of Parkinson's Disease.   IN Ed was given Cardizem 20 mgs IVP started on Metoprolol 12.5 mg tid. His lower leg infection is being treated with broad spectrum antibiotic  CHADS VASC is 1 based on age however with RHD .  He lives in [place] dopplers of both legs negative for DVT  ECHO resulted . Estimated pulmonary artery systolic pressure is 34 mmHg, consistent with borderline pulmonary   hypertension.    Assessment AT Fib with RVR in the setting of an infectious process.  Pt has self converted and remains in SR.  He resided full time in [place] and was scheduled to return home today.     73 year old male with new onset of AT Fib.  Although his lactate level is normal he is being treated for a bug bit of his lower extremity.  He has a hx of Parkinson's Disease.   IN Ed was given Cardizem 20 mgs IVP started on Metoprolol 12.5 mg tid. His lower leg infection is being treated with broad spectrum antibiotic  CHADS VASC is 1 based on age however with RHD .  He lives in [place] dopplers of both legs negative for DVT  ECHO resulted . Estimated pulmonary artery systolic pressure is 34 mmHg, consistent with borderline pulmonary   hypertension.    Assessment AT Fib with RVR in the setting of an infectious process.  Pt has self converted and remains in SR.  He resided full time in [place] and was scheduled to return home today.  Will decrease Metroprolol dose to 12.5 bid instead of TID

## 2024-09-12 NOTE — DIETITIAN INITIAL EVALUATION ADULT - PROBLEM SELECTOR PLAN 2
On augmentin for 3 days  s/p 1 dose rocephin in ED  started on doxycycline.  f/u esr/crp  monitor for improvement.

## 2024-09-12 NOTE — DISCHARGE NOTE PROVIDER - NSDCCPCAREPLAN_GEN_ALL_CORE_FT
PRINCIPAL DISCHARGE DIAGNOSIS  Diagnosis: Rapid atrial fibrillation  Assessment and Plan of Treatment: During this hospital admission you were found to be in Atrial Fibrillation. This is an irregular, often rapid heart rate that commonly causes poor blood flow. The heart's upper chambers (atria) beat out of coordination with the lower chambers (ventricles). This condition may have no symptoms, but when symptoms do appear they include palpitations, shortness of breath, and fatigue. Treatments include drugs, electrical shock (cardioversion), and minimally invasive surgery (ablation). Continue with toprol and follow up with your doctor in [place]        SECONDARY DISCHARGE DIAGNOSES  Diagnosis: Cellulitis of left lower leg  Assessment and Plan of Treatment: You were admitted to the hospital because you were having redness and warmth of your extremity. You were found to have cellulitis. This is a common and potentially serious bacterial skin infection. With cellulitis, the bacteria enter the skin. Cellulitis may spread rapidly. Affected skin appears swollen and red and may be hot and tender. Without treatment with an antibiotic, cellulitis can be life-threatening. You were initially treated with intravenous antibiotics and transitioned to oral pills. Continue to take your . Please follow up with your primary care doctor in 7-10 days for further evaluation and treatment.

## 2024-09-12 NOTE — DISCHARGE NOTE NURSING/CASE MANAGEMENT/SOCIAL WORK - NSDCPEFALRISK_GEN_ALL_CORE
For information on Fall & Injury Prevention, visit: [email or web address] OR  [email or web address] OR  https://www.cdc.gov/steadi/patient.html

## 2024-09-12 NOTE — PROGRESS NOTE ADULT - SUBJECTIVE AND OBJECTIVE BOX
73M with history of Parkinson and Rheumatic fever as a child  p/w complaints of palpitations.   Patient is from [place], in [place] visiting daughter. Had possible "Bug bite" on left lower lateral calf 1 week ago, formed a round area of erythematous skin with central dark spot. He went to  and was prescribed Augmentin, which he has been taking for 3 days. The wife at bedside reports er the rash had a rapid progression initially. Last night patient woke up around 1 am with feeling of palpitations and came to [facility]. Patient reports 1 similar episode of palpitations in the past, several years ago, and was told by his doctor to stop drinking caffeine and never had it again. Reports he has been drinking caffeine again for the past 3 years, and had been ok, drinks 2-3 cups a day. No other acute complaints and     In ED patient found to be in Afib w/ RVR, received diltiazem and IV fluids, BP became soft, HR still in 130s-150s, in Afib, admitted to  for further care.        TELE:  SR 74 BPM presently, Has been SR 60 -70s while on Metoprolol 12.5 mg tid (one dose was held due to parameter restrictions     PAST MEDICAL & SURGICAL HISTORY:  Parkinson's disease      History of rheumatic heart disease          FAMILY HISTORY:      MEDICATIONS  (STANDING):  aspirin  chewable 81 milliGRAM(s) Oral daily  carbidopa/levodopa  25/100 1 Tablet(s) Oral three times a day  doxycycline IVPB      doxycycline IVPB 100 milliGRAM(s) IV Intermittent every 12 hours  enoxaparin Injectable 40 milliGRAM(s) SubCutaneous every 24 hours  influenza  Vaccine (HIGH DOSE) 0.5 milliLiter(s) IntraMuscular once  lactated ringers. 1000 milliLiter(s) (100 mL/Hr) IV Continuous <Continuous>  metoprolol tartrate 12.5 milliGRAM(s) Oral three times a day  rotigotine patch 4 mG/24 Hr(s) 2 Patch Transdermal every 24 hours    MEDICATIONS  (PRN):  acetaminophen     Tablet .. 650 milliGRAM(s) Oral every 6 hours PRN Mild Pain (1 - 3)  aluminum hydroxide/magnesium hydroxide/simethicone Suspension 30 milliLiter(s) Oral every 4 hours PRN Dyspepsia  melatonin 3 milliGRAM(s) Oral at bedtime PRN Insomnia  ondansetron Injectable 4 milliGRAM(s) IV Push every 6 hours PRN Nausea and/or Vomiting      Allergies    No Known Allergies    Intolerances        Vital Signs Last 24 Hrs  T(C): 36.8 (12 Sep 2024 08:37), Max: 36.8 (11 Sep 2024 12:45)  T(F): 98.2 (12 Sep 2024 08:37), Max: 98.2 (11 Sep 2024 12:45)  HR: 62 (12 Sep 2024 08:37) (62 - 82)  BP: 112/62 (12 Sep 2024 08:37) (91/59 - 121/64)  BP(mean): 85 (11 Sep 2024 14:08) (68 - 85)  RR: 18 (12 Sep 2024 08:37) (18 - 18)  SpO2: 99% (12 Sep 2024 08:37) (99% - 100%)    Parameters below as of 12 Sep 2024 08:37  Patient On (Oxygen Delivery Method): room air        PHYSICAL EXAMINATION:    GENERAL APPEARANCE:  Pt. is not currently dyspneic, in no distress. Pt. is alert, oriented, and pleasant.  HEENT:  Pupils are normal and react normally. No icterus. Mucous membranes well colored.  NECK:  Supple. No lymphadenopathy. Jugular venous pressure not elevated. Carotids equal.   HEART:   The cardiac impulse has a normal quality. There are no murmurs, rubs or gallops noted  CHEST:  Chest is clear to auscultation. Normal respiratory effort.  ABDOMEN:  Soft and nontender.   EXTREMITIES:  There is no edema.   SKIN:  No rash or significant lesions are noted.    LABS:                        12.8   5.04  )-----------( 157      ( 12 Sep 2024 06:55 )             36.9     09-12    139  |  107  |  11  ----------------------------<  95  3.8   |  26  |  0.85    Ca    8.9      12 Sep 2024 06:55  Phos  3.1     09-11  Mg     1.9     09-11    TPro  5.6<L>  /  Alb  2.8<L>  /  TBili  0.8  /  DBili  x   /  AST  13<L>  /  ALT  16  /  AlkPhos  59  09-11    PT/INR - ( 11 Sep 2024 02:41 )   PT: 10.1 sec;   INR: 0.89 ratio         PTT - ( 11 Sep 2024 02:41 )  PTT:29.2 sec  Urinalysis Basic - ( 12 Sep 2024 06:55 )    Color: x / Appearance: x / SG: x / pH: x  Gluc: 95 mg/dL / Ketone: x  / Bili: x / Urobili: x   Blood: x / Protein: x / Nitrite: x   Leuk Esterase: x / RBC: x / WBC x   Sq Epi: x / Non Sq Epi: x / Bacteria: x            RADIOLOGY & ADDITIONAL TESTS:  1. Left ventricular cavity is normal in size. Left ventricular wall thickness is normal. Left ventricular   systolic function is normal with an ejection fraction of 58 % by Saunders's method of disks with an   ejection fraction visually estimated at 55 to 60 %.  2. Left atrium is normal in size.  3. Mild mitral regurgitation.  4. Mild tricuspid regurgitation.  5. Estimated pulmonary artery systolic pressure is 34 mmHg, consistent with borderline pulmonary   hypertension.  ________________________________________________________________________________________  FINDINGS:   Left Ventricle:  The left ventricular cavity is normal in size. Left ventricular wall thickness is normal. Left ventricular systolic   function is normal with a calculated ejection fraction of 58 % by the Saunders's biplane method of disks with   an ejection fraction visually estimated at 55 to 60%. Unable to assess left ventricular diastolic function due   to insufficient data.   Right Ventricle:  The right ventricular cavity is normal in size and right ventricular systolic function is normal. Tricuspid   annular plane systolic excursion (TAPSE) is 2.0 cm (normal >=1.7 cm[place] [address]  Phone: [phone]  Fax: [phone]       TRANSTHORACIC ECHOCARDIOGRAM REPORT  _______________________________________________________________________________________  Pt. Name: VERO RAMSAY Study Date: 9/11/2024  MRN: [id number] YOB: 1950  Accession #: [id number] Age: 73 years  Account#: [id number] Gender: M  Heart Rate: Height: 65.00 in (165.10 cm)  Rhythm: Weight: 145.00 lb (65.77 kg)  Blood Pressure: 104/83 mmHg BSA/BMI: 1.73 m² / 24.13 kg/m²  Referring Physician: [id number] Joss De La Garza  Interpreting Physician: Torsten Foreman MD  Primary Sonographer: Mavis Washington RDCS  CPT: ECHO TTE WO CON COMP W DOPP - 28764.m  Indication(s): Abnormal electrocardiogram ECG/EKG - R94.31  Procedure: Transthoracic echocardiogram with 2-D, M-mode and complete spectral and   color flow Doppler.   Ordering Location: ED  Admission Status: Inpatient   Study Information: Atrial Fibrillation throughout the study - LV Diastology not performed.   Final  VERO RAMSAY [id number] 9/11/2024  Page 1 of 4  Left Atrium:  The left atrium is normal in size with an indexed volume of 13.85 ml/m².   Right Atrium:  The right atrium is normal in size with an indexed volume of 12.87 ml/m² and an indexed area of 6.72   cm²/m².   Interatrial Septum:  The interatrial septum was not well visualized.   Aortic Valve:  The aortic valve is tricuspid with normal leaflet excursion. There is mild calcification of the aortic valve   leaflets. The peak transaortic velocity is 1.02 m/s, peak transaortic gradient is 4.2 mmHg and mean   transaortic gradient is 2.0 mmHg with an LVOT/aortic valve VTI ratio of 0.95. The aortic valve area is   estimated at 3.00 cm² by the continuity equation. There is no evidence of aortic regurgitation.   Mitral Valve:  Structurally normal mitral valve with normal leaflet excursion. There is mitral valve thickening of the anterior   and posterior leaflets. There is mild calcification of the mitral valve annulus. There is mild mitral   regurgitation. Mitral inflow is E-wave dominant (>1.2m/sec).   Tricuspid Valve:  Structurally normal tricuspid valve with normal leaflet excursion. There is mild tricuspid regurgitation.   Estimated pulmonary artery systolic pressure is 34 mmHg, consistent with borderline pulmonary   hypertension.   Pulmonic Valve:  The pulmonic valve was not well visualized. There is trace pulmonic regurgitation.   Aorta:  The aortic root at the sinuses of Valsalva is normal in size, measuring 3.50 cm (indexed 2.03 cm/m²).   Pericardium:  No pericardial effusion seen.   Systemic Veins:  The inferior vena cava is normal in size measuring 1.86 cm in diameter, (normal <2.1cm) with normal   inspiratory collapse (normal >50%) consistent with normal right atrial pressure (~3, range 0-5mmHg

## 2024-09-12 NOTE — DIETITIAN INITIAL EVALUATION ADULT - PROBLEM SELECTOR PLAN 1
New onset  Patient had a recent long flight, limited ambulation - US dopplers neg - send D-dimer.  Obtain TSH  Start metoprolol tartrate 12.5 TID, and aspirin   C/w IV hydration  Monitor on tele  Cardio and EP consult.

## 2024-09-12 NOTE — DIETITIAN INITIAL EVALUATION ADULT - PERTINENT LABORATORY DATA
09-12    139  |  107  |  11  ----------------------------<  95  3.8   |  26  |  0.85    Ca    8.9      12 Sep 2024 06:55  Phos  3.1     09-11  Mg     1.9     09-11    TPro  5.6<L>  /  Alb  2.8<L>  /  TBili  0.8  /  DBili  x   /  AST  13<L>  /  ALT  16  /  AlkPhos  59  09-11  A1C with Estimated Average Glucose Result: 5.2 % (09-11-24 @ 06:45)

## 2024-09-12 NOTE — DIETITIAN INITIAL EVALUATION ADULT - PERTINENT MEDS FT
MEDICATIONS  (STANDING):  aspirin  chewable 81 milliGRAM(s) Oral daily  carbidopa/levodopa  25/100 1 Tablet(s) Oral three times a day  doxycycline IVPB      doxycycline IVPB 100 milliGRAM(s) IV Intermittent every 12 hours  enoxaparin Injectable 40 milliGRAM(s) SubCutaneous every 24 hours  influenza  Vaccine (HIGH DOSE) 0.5 milliLiter(s) IntraMuscular once  lactated ringers. 1000 milliLiter(s) (100 mL/Hr) IV Continuous <Continuous>  metoprolol tartrate 12.5 milliGRAM(s) Oral three times a day  rotigotine patch 4 mG/24 Hr(s) 2 Patch Transdermal every 24 hours    MEDICATIONS  (PRN):  acetaminophen     Tablet .. 650 milliGRAM(s) Oral every 6 hours PRN Mild Pain (1 - 3)  aluminum hydroxide/magnesium hydroxide/simethicone Suspension 30 milliLiter(s) Oral every 4 hours PRN Dyspepsia  melatonin 3 milliGRAM(s) Oral at bedtime PRN Insomnia  ondansetron Injectable 4 milliGRAM(s) IV Push every 6 hours PRN Nausea and/or Vomiting

## 2024-09-12 NOTE — DISCHARGE NOTE PROVIDER - CARE PROVIDERS DIRECT ADDRESSES
[email or web address]

## 2024-09-12 NOTE — DISCHARGE NOTE NURSING/CASE MANAGEMENT/SOCIAL WORK - PATIENT PORTAL LINK FT
You can access the FollowMyHealth Patient Portal offered by [facility] by registering at the following website: [email or web address]. By joining [employer]s FollowMyHealth portal, you will also be able to view your health information using other applications (apps) compatible with our system.

## 2024-09-12 NOTE — DISCHARGE NOTE PROVIDER - HOSPITAL COURSE
#Discharge: do not delete  73M pmh of parkinsons admitted for new onset Afib and cellulitis.  Problem List/Main Diagnoses (system-based):    Afib. New onset  Patient had a recent long flight, limited ambulation - US dopplers neg  self converted to NSR and remained for 24 hours  on metoprolol tartrate 12.5 BID and aspirin   seen by EP here  plans to get zio patch with doctors home in [place]    Cellulitis.   On augmentin for 3 days  s/p 1 dose rocephin in ED  improvement on doxycycline  -sent with 10d of doxycycline    Parkinsons.   -c/w home sinamet    Inpatient treatment course: as above  New medications: lopressor & doxycycline

## 2024-09-12 NOTE — DISCHARGE NOTE PROVIDER - NSDCMRMEDTOKEN_GEN_ALL_CORE_FT
aspirin 81 mg oral tablet, chewable: 1 tab(s) orally once a day  carbidopa-levodopa 25 mg-100 mg oral tablet: 1 tab(s) orally 3 times a day ***Patient takes 1-2 tablets TID depending on symptoms***  doxycycline hyclate 100 mg oral capsule: 1 cap(s) orally every 12 hours  metoprolol tartrate 25 mg oral tablet: 0.5 tab(s) orally every 12 hours  Neupro 8 mg/24 hr transdermal film, extended release: 1 patch transdermally once a day

## 2024-09-12 NOTE — DIETITIAN INITIAL EVALUATION ADULT - ORAL INTAKE PTA/DIET HISTORY
Pt lives with his wife, they both shop and cook.  They eat fresh food half the time, but they also order food in the other half because the patient reports they are "lazy."  They request limited sodium in their food and watch salt intake at home.  Based on patient's  dietary recall, po intake likely < 75% ENN > 1 month.

## 2024-09-12 NOTE — DISCHARGE NOTE PROVIDER - DETAILS OF MALNUTRITION DIAGNOSIS/DIAGNOSES
This patient has been assessed with a concern for Malnutrition and was treated during this hospitalization for the following Nutrition diagnosis/diagnoses:     -  09/12/2024: Severe protein-calorie malnutrition

## 2024-09-12 NOTE — DIETITIAN INITIAL EVALUATION ADULT - OTHER INFO
73M with history of Parkinson p/w complaints of palpitations. Patient is from [place], in [place] visiting daughter. Had possible "Bug bite" on left lower lateral calf 1 week ago, formed a round area of erythematous skin with central dark spot. He went to  and was prescribed Augmentin, which he has been taking for 3 days. The wife at bedside reports the rash initially looked more extensive with streaking up the leg, now looks a little better, however the rash had a rapid progression initially. Last night patient woke up around 1 am with feeling of palpitations and came to [facility]. Patient reports 1 similar episode of palpitations in the past, several years ago, and was told by his doctor to stop drinking caffeine and never had it again. Reports he has been drinking caffiene again for the past 3 years, and had been ok, drinks 2-3 cups a day. No other acute complaints and ROS otherwise benign. In ED patient found to be in Afib w/ RVR, recieved diltiazem and IV fluids, BP became soft, HR still in 130s-150s, in Afib, admitted to  for further care.     Admit dx    atrial fibrillation  RD bedscale wt is 66 kg   145#  NFPE reveals muscle wasting, fat wasting - severe  Pt with fair appetite at , declines supplements  HgbA1c is 5.2  PT has cellulitis, elevated CRP  suggest Confirm Goals of Care regarding nutrition support. Will provide nutrition/ hydration within goals of care.   Recommendations to follow in Plan/Intervention  73M with history of Parkinson p/w complaints of palpitations. Patient is from [place], in [place] visiting daughter. Had possible "Bug bite" on left lower lateral calf 1 week ago, formed a round area of erythematous skin with central dark spot. He went to  and was prescribed Augmentin, which he has been taking for 3 days. The wife at bedside reports the rash initially looked more extensive with streaking up the leg, now looks a little better, however the rash had a rapid progression initially. Last night patient woke up around 1 am with feeling of palpitations and came to [facility]. Patient reports 1 similar episode of palpitations in the past, several years ago, and was told by his doctor to stop drinking caffeine and never had it again. Reports he has been drinking caffiene again for the past 3 years, and had been ok, drinks 2-3 cups a day. No other acute complaints and ROS otherwise benign. In ED patient found to be in Afib w/ RVR, recieved diltiazem and IV fluids, BP became soft, HR still in 130s-150s, in Afib, admitted to  for further care.     Admit dx    atrial fibrillation  RD bedscale wt is 66 kg   145#  NFPE reveals muscle wasting, fat wasting - severe  Pt with fair appetite at , declines supplements  PT has Parkinson's disease, but reports no issues with swallowing.   PT given CHF education, written and oral  HgbA1c is 5.2  PT has cellulitis, elevated CRP  suggest Confirm Goals of Care regarding nutrition support. Will provide nutrition/ hydration within goals of care.   Recommendations to follow in Plan/Intervention

## 2024-09-16 LAB
CULTURE RESULTS: SIGNIFICANT CHANGE UP
CULTURE RESULTS: SIGNIFICANT CHANGE UP
SPECIMEN SOURCE: SIGNIFICANT CHANGE UP
SPECIMEN SOURCE: SIGNIFICANT CHANGE UP
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
## 2024-09-18 DIAGNOSIS — E43 UNSPECIFIED SEVERE PROTEIN-CALORIE MALNUTRITION: ICD-10-CM

## 2024-09-18 DIAGNOSIS — S80.862A INSECT BITE (NONVENOMOUS), LEFT LOWER LEG, INITIAL ENCOUNTER: ICD-10-CM

## 2024-09-18 DIAGNOSIS — G20.A1 PARKINSON'S DISEASE WITHOUT DYSKINESIA, WITHOUT MENTION OF FLUCTUATIONS: ICD-10-CM

## 2024-09-18 DIAGNOSIS — Z79.2 LONG TERM (CURRENT) USE OF ANTIBIOTICS: ICD-10-CM

## 2024-09-18 DIAGNOSIS — L03.116 CELLULITIS OF LEFT LOWER LIMB: ICD-10-CM
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
## 2024-09-18 DIAGNOSIS — I27.20 PULMONARY HYPERTENSION, UNSPECIFIED: ICD-10-CM

## 2024-09-18 DIAGNOSIS — I48.91 UNSPECIFIED ATRIAL FIBRILLATION: ICD-10-CM

## 2025-04-29 NOTE — PATIENT PROFILE ADULT - FUNCTIONAL ASSESSMENT - DAILY ACTIVITY 1.
Strong peripheral pulses/Capillary refill less/equal to 2 seconds 4 = No assist / stand by assistance